# Patient Record
Sex: FEMALE | Race: WHITE | NOT HISPANIC OR LATINO
[De-identification: names, ages, dates, MRNs, and addresses within clinical notes are randomized per-mention and may not be internally consistent; named-entity substitution may affect disease eponyms.]

---

## 2017-01-04 ENCOUNTER — APPOINTMENT (OUTPATIENT)
Dept: RADIOLOGY | Facility: CLINIC | Age: 57
End: 2017-01-04

## 2017-01-04 ENCOUNTER — OUTPATIENT (OUTPATIENT)
Dept: OUTPATIENT SERVICES | Facility: HOSPITAL | Age: 57
LOS: 1 days | End: 2017-01-04
Payer: COMMERCIAL

## 2017-01-04 DIAGNOSIS — Z00.8 ENCOUNTER FOR OTHER GENERAL EXAMINATION: ICD-10-CM

## 2017-01-04 PROBLEM — Z00.00 ENCOUNTER FOR PREVENTIVE HEALTH EXAMINATION: Status: ACTIVE | Noted: 2017-01-04

## 2017-01-04 PROCEDURE — 73630 X-RAY EXAM OF FOOT: CPT | Mod: 26,RT

## 2017-01-04 PROCEDURE — 73630 X-RAY EXAM OF FOOT: CPT

## 2018-02-23 ENCOUNTER — APPOINTMENT (OUTPATIENT)
Dept: SURGERY | Facility: CLINIC | Age: 58
End: 2018-02-23
Payer: COMMERCIAL

## 2018-02-23 VITALS
OXYGEN SATURATION: 99 % | TEMPERATURE: 97.7 F | HEART RATE: 67 BPM | BODY MASS INDEX: 29.02 KG/M2 | SYSTOLIC BLOOD PRESSURE: 125 MMHG | WEIGHT: 170 LBS | DIASTOLIC BLOOD PRESSURE: 78 MMHG | RESPIRATION RATE: 15 BRPM | HEIGHT: 64 IN

## 2018-02-23 DIAGNOSIS — Z86.72 PERSONAL HISTORY OF THROMBOPHLEBITIS: ICD-10-CM

## 2018-02-23 DIAGNOSIS — Z78.9 OTHER SPECIFIED HEALTH STATUS: ICD-10-CM

## 2018-02-23 DIAGNOSIS — Z82.49 FAMILY HISTORY OF ISCHEMIC HEART DISEASE AND OTHER DISEASES OF THE CIRCULATORY SYSTEM: ICD-10-CM

## 2018-02-23 PROCEDURE — 99244 OFF/OP CNSLTJ NEW/EST MOD 40: CPT

## 2018-02-23 RX ORDER — FAMOTIDINE 20 MG/1
20 TABLET, FILM COATED ORAL
Refills: 0 | Status: ACTIVE | COMMUNITY

## 2018-02-27 ENCOUNTER — OUTPATIENT (OUTPATIENT)
Dept: OUTPATIENT SERVICES | Facility: HOSPITAL | Age: 58
LOS: 1 days | End: 2018-02-27
Payer: COMMERCIAL

## 2018-02-27 VITALS
SYSTOLIC BLOOD PRESSURE: 126 MMHG | HEIGHT: 63.5 IN | HEART RATE: 65 BPM | RESPIRATION RATE: 20 BRPM | DIASTOLIC BLOOD PRESSURE: 68 MMHG | TEMPERATURE: 98 F | WEIGHT: 170.2 LBS

## 2018-02-27 DIAGNOSIS — R10.11 RIGHT UPPER QUADRANT PAIN: ICD-10-CM

## 2018-02-27 DIAGNOSIS — S69.90XA UNSPECIFIED INJURY OF UNSPECIFIED WRIST, HAND AND FINGER(S), INITIAL ENCOUNTER: Chronic | ICD-10-CM

## 2018-02-27 DIAGNOSIS — R79.89 OTHER SPECIFIED ABNORMAL FINDINGS OF BLOOD CHEMISTRY: Chronic | ICD-10-CM

## 2018-02-27 DIAGNOSIS — Z90.711 ACQUIRED ABSENCE OF UTERUS WITH REMAINING CERVICAL STUMP: Chronic | ICD-10-CM

## 2018-02-27 DIAGNOSIS — Z87.828 PERSONAL HISTORY OF OTHER (HEALED) PHYSICAL INJURY AND TRAUMA: Chronic | ICD-10-CM

## 2018-02-27 DIAGNOSIS — Z01.818 ENCOUNTER FOR OTHER PREPROCEDURAL EXAMINATION: ICD-10-CM

## 2018-02-27 LAB
ALBUMIN SERPL ELPH-MCNC: 4.3 G/DL — SIGNIFICANT CHANGE UP (ref 3.3–5.2)
ALP SERPL-CCNC: 82 U/L — SIGNIFICANT CHANGE UP (ref 40–120)
ALT FLD-CCNC: 19 U/L — SIGNIFICANT CHANGE UP
AMYLASE P1 CFR SERPL: 64 U/L — SIGNIFICANT CHANGE UP (ref 36–128)
ANION GAP SERPL CALC-SCNC: 13 MMOL/L — SIGNIFICANT CHANGE UP (ref 5–17)
APTT BLD: 30.8 SEC — SIGNIFICANT CHANGE UP (ref 27.5–37.4)
AST SERPL-CCNC: 19 U/L — SIGNIFICANT CHANGE UP
BILIRUB SERPL-MCNC: 0.5 MG/DL — SIGNIFICANT CHANGE UP (ref 0.4–2)
BLD GP AB SCN SERPL QL: SIGNIFICANT CHANGE UP
BUN SERPL-MCNC: 16 MG/DL — SIGNIFICANT CHANGE UP (ref 8–20)
CALCIUM SERPL-MCNC: 9.7 MG/DL — SIGNIFICANT CHANGE UP (ref 8.6–10.2)
CHLORIDE SERPL-SCNC: 102 MMOL/L — SIGNIFICANT CHANGE UP (ref 98–107)
CO2 SERPL-SCNC: 27 MMOL/L — SIGNIFICANT CHANGE UP (ref 22–29)
CREAT SERPL-MCNC: 0.84 MG/DL — SIGNIFICANT CHANGE UP (ref 0.5–1.3)
GLUCOSE SERPL-MCNC: 90 MG/DL — SIGNIFICANT CHANGE UP (ref 70–115)
HCT VFR BLD CALC: 43.5 % — SIGNIFICANT CHANGE UP (ref 37–47)
HGB BLD-MCNC: 13.9 G/DL — SIGNIFICANT CHANGE UP (ref 12–16)
INR BLD: 0.95 RATIO — SIGNIFICANT CHANGE UP (ref 0.88–1.16)
LIDOCAIN IGE QN: 65 U/L — HIGH (ref 22–51)
MCHC RBC-ENTMCNC: 28.1 PG — SIGNIFICANT CHANGE UP (ref 27–31)
MCHC RBC-ENTMCNC: 32 G/DL — SIGNIFICANT CHANGE UP (ref 32–36)
MCV RBC AUTO: 87.9 FL — SIGNIFICANT CHANGE UP (ref 81–99)
PLATELET # BLD AUTO: 279 K/UL — SIGNIFICANT CHANGE UP (ref 150–400)
POTASSIUM SERPL-MCNC: 4.7 MMOL/L — SIGNIFICANT CHANGE UP (ref 3.5–5.3)
POTASSIUM SERPL-SCNC: 4.7 MMOL/L — SIGNIFICANT CHANGE UP (ref 3.5–5.3)
PROT SERPL-MCNC: 7.2 G/DL — SIGNIFICANT CHANGE UP (ref 6.6–8.7)
PROTHROM AB SERPL-ACNC: 10.4 SEC — SIGNIFICANT CHANGE UP (ref 9.8–12.7)
RBC # BLD: 4.95 M/UL — SIGNIFICANT CHANGE UP (ref 4.4–5.2)
RBC # FLD: 13.7 % — SIGNIFICANT CHANGE UP (ref 11–15.6)
SODIUM SERPL-SCNC: 142 MMOL/L — SIGNIFICANT CHANGE UP (ref 135–145)
TYPE + AB SCN PNL BLD: SIGNIFICANT CHANGE UP
WBC # BLD: 8.6 K/UL — SIGNIFICANT CHANGE UP (ref 4.8–10.8)
WBC # FLD AUTO: 8.6 K/UL — SIGNIFICANT CHANGE UP (ref 4.8–10.8)

## 2018-02-27 PROCEDURE — 86850 RBC ANTIBODY SCREEN: CPT

## 2018-02-27 PROCEDURE — 83690 ASSAY OF LIPASE: CPT

## 2018-02-27 PROCEDURE — 85027 COMPLETE CBC AUTOMATED: CPT

## 2018-02-27 PROCEDURE — 85610 PROTHROMBIN TIME: CPT

## 2018-02-27 PROCEDURE — 93005 ELECTROCARDIOGRAM TRACING: CPT

## 2018-02-27 PROCEDURE — G0463: CPT

## 2018-02-27 PROCEDURE — 80053 COMPREHEN METABOLIC PANEL: CPT

## 2018-02-27 PROCEDURE — 82150 ASSAY OF AMYLASE: CPT

## 2018-02-27 PROCEDURE — 86901 BLOOD TYPING SEROLOGIC RH(D): CPT

## 2018-02-27 PROCEDURE — 85730 THROMBOPLASTIN TIME PARTIAL: CPT

## 2018-02-27 PROCEDURE — 36415 COLL VENOUS BLD VENIPUNCTURE: CPT

## 2018-02-27 PROCEDURE — 86900 BLOOD TYPING SEROLOGIC ABO: CPT

## 2018-02-27 PROCEDURE — 93010 ELECTROCARDIOGRAM REPORT: CPT

## 2018-02-27 RX ORDER — CEFAZOLIN SODIUM 1 G
2000 VIAL (EA) INJECTION ONCE
Qty: 0 | Refills: 0 | Status: DISCONTINUED | OUTPATIENT
Start: 2018-03-01 | End: 2018-03-16

## 2018-02-27 NOTE — H&P PST ADULT - FAMILY HISTORY
Father  Still living? No  Family history of heart attack, Age at diagnosis: Age Unknown  Family history of prostate cancer in father, Age at diagnosis: Age Unknown     Mother  Still living? Yes, Estimated age: 81-90  Family history of hyperlipidemia, Age at diagnosis: Age Unknown

## 2018-02-27 NOTE — H&P PST ADULT - GASTROINTESTINAL DETAILS
no distention/bowel sounds normal/no rebound tenderness/no rigidity/no organomegaly/normal/soft/no bruit/no guarding/nontender/no masses palpable

## 2018-02-27 NOTE — H&P PST ADULT - NEGATIVE BREAST SYMPTOMS
no breast tenderness R/no breast lump L/no nipple discharge L/no nipple discharge R/no breast lump R/no breast tenderness L

## 2018-02-27 NOTE — H&P PST ADULT - NEGATIVE GENERAL SYMPTOMS
no chills/no sweating/no anorexia/no weight loss/no polyuria/no malaise/no fatigue/no polydipsia/no weight gain/no polyphagia/no fever

## 2018-02-27 NOTE — H&P PST ADULT - ATTENDING COMMENTS
The risks, benefits, and alternatives including the option of doing nothing to a laparoscopic and possible open cholecystectomy were discussed.  The potential complications including but not limited to infection, bleeding, bile leak, and bile duct injury were discussed.  The patient admitted understanding and agrees to proceed as planned.

## 2018-02-27 NOTE — H&P PST ADULT - NEGATIVE MUSCULOSKELETAL SYMPTOMS
no muscle weakness/no arthritis/no arthralgia/no myalgia/no stiffness/no neck pain/no arm pain L/no arm pain R/no leg pain R/no back pain/no joint swelling/no muscle cramps/no leg pain L

## 2018-02-27 NOTE — H&P PST ADULT - PSH
S/P partial hysterectomy  1996 H/O tear of meniscus of knee joint  repaired 2005  S/P partial hysterectomy  1996  Wrist injury  left 2001

## 2018-02-27 NOTE — H&P PST ADULT - NEGATIVE FEMALE-SPECIFIC SYMPTOMS
no amenorrhea/no spotting/no irregular menses/no vaginal discharge/no dysmenorrhea/no menorrhagia/no abnormal vaginal bleeding/no pelvic pain

## 2018-02-27 NOTE — H&P PST ADULT - NEGATIVE ENMT SYMPTOMS
no sinus symptoms/no abnormal taste sensation/no ear pain/no tinnitus/no gum bleeding/no hearing difficulty/no nasal discharge/no nose bleeds/no vertigo/no nasal congestion/no nasal obstruction/no dry mouth/no recurrent cold sores/no throat pain/no dysphagia

## 2018-02-27 NOTE — ASU PATIENT PROFILE, ADULT - ABILITY TO HEAR (WITH HEARING AID OR HEARING APPLIANCE IF NORMALLY USED):
Mildly to Moderately Impaired: difficulty hearing in some environments or speaker may need to increase volume or speak distinctly/bilat. hearing aids

## 2018-02-27 NOTE — H&P PST ADULT - NEGATIVE NEUROLOGICAL SYMPTOMS
no paresthesias/no loss of consciousness/no tremors/no vertigo/no loss of sensation/no headache/no transient paralysis/no weakness/no generalized seizures/no syncope/no difficulty walking/no facial palsy/no hemiparesis/no focal seizures/no confusion

## 2018-02-27 NOTE — ASU PATIENT PROFILE, ADULT - PSH
H/O tear of meniscus of knee joint  repaired 2005  S/P partial hysterectomy  1996  Wrist injury  left 2001

## 2018-02-27 NOTE — H&P PST ADULT - NEUROLOGICAL DETAILS
responds to verbal commands/deep reflexes intact/normal strength/sensation intact/alert and oriented x 3/responds to pain/cranial nerves intact/no spontaneous movement/superficial reflexes intact

## 2018-02-27 NOTE — H&P PST ADULT - NEGATIVE GENERAL GENITOURINARY SYMPTOMS
no nocturia/normal libido/no hematuria/no renal colic/no flank pain R/no flank pain L/no urine discoloration/no gas in urine/no urinary hesitancy/no incontinence/normal urinary frequency/no bladder infections/no dysuria

## 2018-02-27 NOTE — H&P PST ADULT - NEGATIVE PSYCHIATRIC SYMPTOMS
no depression/no insomnia/no paranoia/no anxiety/no visual hallucinations/no suicidal ideation/no memory loss/no mood swings/no agitation/no auditory hallucinations/no hyperactivity

## 2018-02-27 NOTE — H&P PST ADULT - NEGATIVE OPHTHALMOLOGIC SYMPTOMS
no diplopia/no discharge L/no pain L/no irritation R/no irritation L/no loss of vision R/no scleral injection R/no lacrimation L/no discharge R/no photophobia/no lacrimation R/no blurred vision L/no loss of vision L/no scleral injection L/no blurred vision R/no pain R

## 2018-02-27 NOTE — H&P PST ADULT - MUSCULOSKELETAL
details… detailed exam no joint warmth/normal/ROM intact/no joint erythema/normal strength/no calf tenderness/no joint swelling

## 2018-02-27 NOTE — H&P PST ADULT - NEGATIVE SKIN SYMPTOMS
no itching/no dryness/no change in size/color of mole/no hair loss/no brittle nails/no tumor/no pitted nails/no rash

## 2018-02-27 NOTE — H&P PST ADULT - NEGATIVE CARDIOVASCULAR SYMPTOMS
no peripheral edema/no palpitations/no orthopnea/no paroxysmal nocturnal dyspnea/no claudication/no chest pain/no dyspnea on exertion

## 2018-02-27 NOTE — ASU PATIENT PROFILE, ADULT - LEARNING ASSESSMENT (PATIENT) ADDITIONAL COMMENTS
Instructed pt on pre-op instructions, tips for safer surgery, pain management scale, pre-surgical infection prevention instructions and verbalized understanding of all.

## 2018-02-27 NOTE — H&P PST ADULT - RS GEN PE MLT RESP DETAILS PC
good air movement/no rhonchi/no wheezes/no intercostal retractions/clear to auscultation bilaterally/no subcutaneous emphysema/no rales/normal/airway patent/breath sounds equal/respirations non-labored/no chest wall tenderness

## 2018-03-01 ENCOUNTER — RESULT REVIEW (OUTPATIENT)
Age: 58
End: 2018-03-01

## 2018-03-01 ENCOUNTER — OUTPATIENT (OUTPATIENT)
Dept: OUTPATIENT SERVICES | Facility: HOSPITAL | Age: 58
LOS: 1 days | End: 2018-03-01
Payer: COMMERCIAL

## 2018-03-01 ENCOUNTER — TRANSCRIPTION ENCOUNTER (OUTPATIENT)
Age: 58
End: 2018-03-01

## 2018-03-01 VITALS
OXYGEN SATURATION: 100 % | DIASTOLIC BLOOD PRESSURE: 64 MMHG | RESPIRATION RATE: 16 BRPM | TEMPERATURE: 98 F | HEART RATE: 66 BPM | HEIGHT: 63 IN | WEIGHT: 170.2 LBS | SYSTOLIC BLOOD PRESSURE: 110 MMHG

## 2018-03-01 VITALS — TEMPERATURE: 99 F

## 2018-03-01 DIAGNOSIS — N80.2 ENDOMETRIOSIS OF FALLOPIAN TUBE: ICD-10-CM

## 2018-03-01 DIAGNOSIS — Z90.711 ACQUIRED ABSENCE OF UTERUS WITH REMAINING CERVICAL STUMP: Chronic | ICD-10-CM

## 2018-03-01 DIAGNOSIS — Z01.818 ENCOUNTER FOR OTHER PREPROCEDURAL EXAMINATION: ICD-10-CM

## 2018-03-01 DIAGNOSIS — Z87.828 PERSONAL HISTORY OF OTHER (HEALED) PHYSICAL INJURY AND TRAUMA: Chronic | ICD-10-CM

## 2018-03-01 DIAGNOSIS — S69.90XA UNSPECIFIED INJURY OF UNSPECIFIED WRIST, HAND AND FINGER(S), INITIAL ENCOUNTER: Chronic | ICD-10-CM

## 2018-03-01 DIAGNOSIS — R10.11 RIGHT UPPER QUADRANT PAIN: ICD-10-CM

## 2018-03-01 LAB — ABO RH CONFIRMATION: SIGNIFICANT CHANGE UP

## 2018-03-01 PROCEDURE — 47562 LAPAROSCOPIC CHOLECYSTECTOMY: CPT

## 2018-03-01 PROCEDURE — 49585: CPT

## 2018-03-01 PROCEDURE — 88302 TISSUE EXAM BY PATHOLOGIST: CPT

## 2018-03-01 PROCEDURE — 88304 TISSUE EXAM BY PATHOLOGIST: CPT | Mod: 26

## 2018-03-01 PROCEDURE — 88304 TISSUE EXAM BY PATHOLOGIST: CPT

## 2018-03-01 PROCEDURE — 49585: CPT | Mod: 59

## 2018-03-01 PROCEDURE — 88302 TISSUE EXAM BY PATHOLOGIST: CPT | Mod: 26

## 2018-03-01 RX ORDER — SODIUM CHLORIDE 9 MG/ML
3 INJECTION INTRAMUSCULAR; INTRAVENOUS; SUBCUTANEOUS ONCE
Qty: 0 | Refills: 0 | Status: DISCONTINUED | OUTPATIENT
Start: 2018-03-01 | End: 2018-03-01

## 2018-03-01 RX ORDER — FENTANYL CITRATE 50 UG/ML
50 INJECTION INTRAVENOUS
Qty: 0 | Refills: 0 | Status: DISCONTINUED | OUTPATIENT
Start: 2018-03-01 | End: 2018-03-01

## 2018-03-01 RX ORDER — SODIUM CHLORIDE 9 MG/ML
1000 INJECTION INTRAMUSCULAR; INTRAVENOUS; SUBCUTANEOUS
Qty: 0 | Refills: 0 | Status: DISCONTINUED | OUTPATIENT
Start: 2018-03-01 | End: 2018-03-01

## 2018-03-01 RX ORDER — PANTOPRAZOLE SODIUM 20 MG/1
1 TABLET, DELAYED RELEASE ORAL
Qty: 0 | Refills: 0 | COMMUNITY

## 2018-03-01 RX ORDER — HYDROMORPHONE HYDROCHLORIDE 2 MG/ML
1 INJECTION INTRAMUSCULAR; INTRAVENOUS; SUBCUTANEOUS EVERY 6 HOURS
Qty: 0 | Refills: 0 | Status: DISCONTINUED | OUTPATIENT
Start: 2018-03-01 | End: 2018-03-01

## 2018-03-01 RX ORDER — ONDANSETRON 8 MG/1
4 TABLET, FILM COATED ORAL ONCE
Qty: 0 | Refills: 0 | Status: COMPLETED | OUTPATIENT
Start: 2018-03-01 | End: 2018-03-01

## 2018-03-01 RX ORDER — OXYCODONE AND ACETAMINOPHEN 5; 325 MG/1; MG/1
2 TABLET ORAL EVERY 6 HOURS
Qty: 0 | Refills: 0 | Status: DISCONTINUED | OUTPATIENT
Start: 2018-03-01 | End: 2018-03-01

## 2018-03-01 RX ORDER — OXYCODONE AND ACETAMINOPHEN 5; 325 MG/1; MG/1
1 TABLET ORAL EVERY 4 HOURS
Qty: 0 | Refills: 0 | Status: DISCONTINUED | OUTPATIENT
Start: 2018-03-01 | End: 2018-03-01

## 2018-03-01 RX ADMIN — FENTANYL CITRATE 50 MICROGRAM(S): 50 INJECTION INTRAVENOUS at 18:00

## 2018-03-01 RX ADMIN — ONDANSETRON 4 MILLIGRAM(S): 8 TABLET, FILM COATED ORAL at 17:50

## 2018-03-01 RX ADMIN — FENTANYL CITRATE 50 MICROGRAM(S): 50 INJECTION INTRAVENOUS at 18:15

## 2018-03-01 RX ADMIN — FENTANYL CITRATE 50 MICROGRAM(S): 50 INJECTION INTRAVENOUS at 17:40

## 2018-03-01 RX ADMIN — Medication 200 MILLIGRAM(S): at 19:00

## 2018-03-01 NOTE — BRIEF OPERATIVE NOTE - POST-OP DX
Biliary colic  03/01/2018    Active  Blanco Patel  Umbilical hernia without obstruction and without gangrene  03/01/2018    Blanco Mas

## 2018-03-01 NOTE — ASU DISCHARGE PLAN (ADULT/PEDIATRIC). - NOTIFY
Unable to Urinate/Inability to Tolerate Liquids or Foods/Pain not relieved by Medications/Persistent Nausea and Vomiting/Bleeding that does not stop/Fever greater than 101/severe abdominal pain and/or jaundice

## 2018-03-01 NOTE — ASU DISCHARGE PLAN (ADULT/PEDIATRIC). - ACTIVITY LEVEL
no tub baths/no sports/gym/max lifting capacity 20 pounds/no exercise/no heavy lifting/no intercourse

## 2018-03-01 NOTE — BRIEF OPERATIVE NOTE - PROCEDURE
<<-----Click on this checkbox to enter Procedure Umbilical hernia repair  03/01/2018    Active  JRIAD  Laparoscopic cholecystectomy  03/01/2018    Active  JRIAD Umbilical hernia repair  03/01/2018    Active  Blanco Patel  Laparoscopic cholecystectomy  03/01/2018    Active  Blanco Patel

## 2018-03-01 NOTE — ASU DISCHARGE PLAN (ADULT/PEDIATRIC). - MEDICATION SUMMARY - MEDICATIONS TO TAKE
I will START or STAY ON the medications listed below when I get home from the hospital:    Protonix 40 mg oral delayed release tablet  -- 1 tab(s) by mouth once a day  -- Indication: For as per PMD

## 2018-03-05 LAB — SURGICAL PATHOLOGY FINAL REPORT - CH: SIGNIFICANT CHANGE UP

## 2018-03-07 ENCOUNTER — APPOINTMENT (OUTPATIENT)
Dept: SURGERY | Facility: CLINIC | Age: 58
End: 2018-03-07
Payer: COMMERCIAL

## 2018-03-07 VITALS
DIASTOLIC BLOOD PRESSURE: 79 MMHG | SYSTOLIC BLOOD PRESSURE: 116 MMHG | TEMPERATURE: 98.6 F | HEIGHT: 64 IN | BODY MASS INDEX: 29.02 KG/M2 | HEART RATE: 71 BPM | WEIGHT: 170 LBS | RESPIRATION RATE: 16 BRPM | OXYGEN SATURATION: 98 %

## 2018-03-07 PROCEDURE — 99024 POSTOP FOLLOW-UP VISIT: CPT

## 2018-03-23 ENCOUNTER — APPOINTMENT (OUTPATIENT)
Dept: SURGERY | Facility: CLINIC | Age: 58
End: 2018-03-23
Payer: COMMERCIAL

## 2018-03-23 VITALS
RESPIRATION RATE: 16 BRPM | SYSTOLIC BLOOD PRESSURE: 120 MMHG | HEIGHT: 64 IN | WEIGHT: 167 LBS | BODY MASS INDEX: 28.51 KG/M2 | OXYGEN SATURATION: 98 % | DIASTOLIC BLOOD PRESSURE: 76 MMHG | TEMPERATURE: 98.3 F | HEART RATE: 70 BPM

## 2018-03-23 DIAGNOSIS — K80.20 CALCULUS OF GALLBLADDER W/OUT CHOLECYSTITIS W/OUT OBSTRUCTION: ICD-10-CM

## 2018-03-23 DIAGNOSIS — Z80.42 FAMILY HISTORY OF MALIGNANT NEOPLASM OF PROSTATE: ICD-10-CM

## 2018-03-23 DIAGNOSIS — R10.11 RIGHT UPPER QUADRANT PAIN: ICD-10-CM

## 2018-03-23 PROCEDURE — 99024 POSTOP FOLLOW-UP VISIT: CPT

## 2018-06-18 NOTE — H&P PST ADULT - NEGATIVE GASTROINTESTINAL SYMPTOMS
no change in bowel habits/no diarrhea/no constipation/no vomiting/no flatulence/no nausea/no steatorrhea/no jaundice/no hematochezia/no hiccoughs/no abdominal pain pinpoint pupils, cataract lenses noted b/l

## 2019-01-12 NOTE — H&P PST ADULT - BREASTS DETAILS
family or friend will provide no mass/no tenderness/nipples normal/normal shape/no discharge or discoloration

## 2022-06-14 ENCOUNTER — APPOINTMENT (OUTPATIENT)
Dept: ORTHOPEDIC SURGERY | Facility: CLINIC | Age: 62
End: 2022-06-14
Payer: COMMERCIAL

## 2022-06-14 VITALS
HEART RATE: 71 BPM | DIASTOLIC BLOOD PRESSURE: 85 MMHG | TEMPERATURE: 98.1 F | SYSTOLIC BLOOD PRESSURE: 153 MMHG | WEIGHT: 170 LBS | HEIGHT: 64 IN | BODY MASS INDEX: 29.02 KG/M2

## 2022-06-14 DIAGNOSIS — M25.561 PAIN IN RIGHT KNEE: ICD-10-CM

## 2022-06-14 DIAGNOSIS — M70.41 PREPATELLAR BURSITIS, RIGHT KNEE: ICD-10-CM

## 2022-06-14 DIAGNOSIS — S80.01XD CONTUSION OF RIGHT KNEE, SUBSEQUENT ENCOUNTER: ICD-10-CM

## 2022-06-14 DIAGNOSIS — M11.261 OTHER CHONDROCALCINOSIS, RIGHT KNEE: ICD-10-CM

## 2022-06-14 PROBLEM — R79.89 OTHER SPECIFIED ABNORMAL FINDINGS OF BLOOD CHEMISTRY: Chronic | Status: ACTIVE | Noted: 2018-02-27

## 2022-06-14 PROBLEM — I34.1 NONRHEUMATIC MITRAL (VALVE) PROLAPSE: Chronic | Status: ACTIVE | Noted: 2018-02-27

## 2022-06-14 PROCEDURE — 73562 X-RAY EXAM OF KNEE 3: CPT | Mod: RT

## 2022-06-14 PROCEDURE — 99203 OFFICE O/P NEW LOW 30 MIN: CPT

## 2022-06-14 RX ORDER — DICLOFENAC SODIUM 75 MG/1
75 TABLET, DELAYED RELEASE ORAL
Qty: 60 | Refills: 0 | Status: ACTIVE | COMMUNITY
Start: 2022-06-14 | End: 1900-01-01

## 2022-06-15 NOTE — HISTORY OF PRESENT ILLNESS
[de-identified] : 06/14/2022: Patient is a 61 year-old female who presents to the office today for initial evaluation of right knee pain. She endorses a fall from a standing height 2 weeks ago, landing on her right knee. Pain is located over the anterolateral aspect of the right knee. She tripped over her dog and landed directly onto the right knee. Standing and walking is non-painful. Touching the area she fell on is sharp and extremely sensitive. She started taking CBD cream and Advil which gave minimal relief. She does note over time, that she has seen some steady improvement, but still has significant pain with palpation. She presents to evaluate for injury.

## 2022-06-15 NOTE — ASSESSMENT
[FreeTextEntry1] : Patient presents with right knee pain. Their symptoms are consistent with a patella contusion and prepatellar bursitis. The nature of this condition was described at length with the patient and they verbalized understanding. \par \par Recommendations: \par -Conservative treatment - Observation/Activities as tolerated\par -Diclofenac x10-14 days\par -Followup as needed\par \par The patient was in full agreement with this plan and appreciative of their care.\par

## 2022-06-15 NOTE — PHYSICAL EXAM
[UE/LE] : Sensory: Intact in bilateral upper & lower extremities [ALL] : dorsalis pedis, posterior tibial, femoral, popliteal, and radial 2+ and symmetric bilaterally [Normal Finger/nose] : finger to nose coordination [Normal] : no peripheral adenopathy appreciated [Poor Appearance] : well-appearing [Acute Distress] : not in acute distress [de-identified] : Right Knee Exam\par \par FROM to hip\par \par Skin Warm, Dry, no erythema, no lesions \par \par Knee \par stable\par anatomic alignment\par ROM 0-130\par Valgus/Varus Stress intact \par Effusion - Negative\par Crepitus - Negative \par Patella Grind - Negative \par +TTP of lateral patella\par Patella Apprehension - Negative \par Medial joint line tenderness - Negative\par Lateral Joint line tenderness - Negative\par Korey Test - Negative  \par Lachman test - Negative \par Anterior Drawer Test -  Negative \par \par Distal Motor Function intact \par Sensation intact to light touch bilaterally \par Pulses 2+ bilaterally\par \par  [de-identified] : ANNETTER [de-identified] : 3 xray views of the right knee were obtained. No fractures or dislocations are noted.

## 2022-06-16 PROBLEM — M25.561 RIGHT KNEE PAIN: Status: ACTIVE | Noted: 2022-06-14
